# Patient Record
Sex: FEMALE | Race: WHITE | Employment: OTHER | URBAN - METROPOLITAN AREA
[De-identification: names, ages, dates, MRNs, and addresses within clinical notes are randomized per-mention and may not be internally consistent; named-entity substitution may affect disease eponyms.]

---

## 2023-07-15 ENCOUNTER — APPOINTMENT (OUTPATIENT)
Dept: INTERVENTIONAL RADIOLOGY/VASCULAR | Facility: HOSPITAL | Age: 68
End: 2023-07-15
Attending: NEUROLOGICAL SURGERY
Payer: MEDICARE

## 2023-07-15 ENCOUNTER — APPOINTMENT (OUTPATIENT)
Dept: CT IMAGING | Facility: HOSPITAL | Age: 68
End: 2023-07-15
Attending: NEUROLOGICAL SURGERY
Payer: MEDICARE

## 2023-07-15 ENCOUNTER — HOSPITAL ENCOUNTER (EMERGENCY)
Facility: HOSPITAL | Age: 68
Discharge: ANOTHER HOSPITAL | End: 2023-07-15
Attending: EMERGENCY MEDICINE
Payer: MEDICARE

## 2023-07-15 ENCOUNTER — APPOINTMENT (OUTPATIENT)
Dept: CT IMAGING | Facility: HOSPITAL | Age: 68
End: 2023-07-15
Attending: EMERGENCY MEDICINE
Payer: MEDICARE

## 2023-07-15 ENCOUNTER — ANESTHESIA EVENT (OUTPATIENT)
Dept: GENERAL RADIOLOGY | Facility: HOSPITAL | Age: 68
End: 2023-07-15
Payer: MEDICARE

## 2023-07-15 ENCOUNTER — APPOINTMENT (OUTPATIENT)
Dept: GENERAL RADIOLOGY | Facility: HOSPITAL | Age: 68
End: 2023-07-15
Attending: EMERGENCY MEDICINE
Payer: MEDICARE

## 2023-07-15 ENCOUNTER — HOSPITAL ENCOUNTER (INPATIENT)
Facility: HOSPITAL | Age: 68
LOS: 4 days | Discharge: HOME | End: 2023-07-19
Attending: EMERGENCY MEDICINE | Admitting: HOSPITALIST
Payer: MEDICARE

## 2023-07-15 ENCOUNTER — APPOINTMENT (OUTPATIENT)
Dept: CT IMAGING | Facility: HOSPITAL | Age: 68
End: 2023-07-15
Payer: MEDICARE

## 2023-07-15 ENCOUNTER — ANESTHESIA (OUTPATIENT)
Dept: GENERAL RADIOLOGY | Facility: HOSPITAL | Age: 68
End: 2023-07-15
Payer: MEDICARE

## 2023-07-15 VITALS
WEIGHT: 199.06 LBS | RESPIRATION RATE: 22 BRPM | HEART RATE: 77 BPM | TEMPERATURE: 99 F | DIASTOLIC BLOOD PRESSURE: 101 MMHG | SYSTOLIC BLOOD PRESSURE: 165 MMHG | OXYGEN SATURATION: 94 %

## 2023-07-15 DIAGNOSIS — I63.9 ACUTE ISCHEMIC STROKE (HCC): Primary | ICD-10-CM

## 2023-07-15 DIAGNOSIS — I63.512 ACUTE ISCHEMIC LEFT MCA STROKE (HCC): ICD-10-CM

## 2023-07-15 DIAGNOSIS — I63.9 ACUTE CVA (CEREBROVASCULAR ACCIDENT) (HCC): Primary | ICD-10-CM

## 2023-07-15 LAB
ALBUMIN SERPL-MCNC: 3.7 G/DL (ref 3.4–5)
ALP LIVER SERPL-CCNC: 74 U/L
ALT SERPL-CCNC: 25 U/L
ANION GAP SERPL CALC-SCNC: 8 MMOL/L (ref 0–18)
AST SERPL-CCNC: 22 U/L (ref 15–37)
ATRIAL RATE: 79 BPM
BASOPHILS # BLD AUTO: 0.07 X10(3) UL (ref 0–0.2)
BASOPHILS NFR BLD AUTO: 0.8 %
BILIRUB DIRECT SERPL-MCNC: 0.2 MG/DL (ref 0–0.2)
BILIRUB SERPL-MCNC: 1.1 MG/DL (ref 0.1–2)
BUN BLD-MCNC: 18 MG/DL (ref 7–18)
BUN/CREAT SERPL: 21.4 (ref 10–20)
CALCIUM BLD-MCNC: 9.3 MG/DL (ref 8.5–10.1)
CHLORIDE SERPL-SCNC: 109 MMOL/L (ref 98–112)
CHOLEST SERPL-MCNC: 173 MG/DL (ref ?–200)
CO2 SERPL-SCNC: 27 MMOL/L (ref 21–32)
CREAT BLD-MCNC: 0.84 MG/DL
DEPRECATED RDW RBC AUTO: 46.2 FL (ref 35.1–46.3)
EOSINOPHIL # BLD AUTO: 0.3 X10(3) UL (ref 0–0.7)
EOSINOPHIL NFR BLD AUTO: 3.4 %
ERYTHROCYTE [DISTWIDTH] IN BLOOD BY AUTOMATED COUNT: 13.6 % (ref 11–15)
EST. AVERAGE GLUCOSE BLD GHB EST-MCNC: 117 MG/DL (ref 68–126)
GFR SERPLBLD BASED ON 1.73 SQ M-ARVRAT: 75 ML/MIN/1.73M2 (ref 60–?)
GLUCOSE BLD-MCNC: 102 MG/DL (ref 70–99)
GLUCOSE BLD-MCNC: 112 MG/DL (ref 70–99)
GLUCOSE BLDC GLUCOMTR-MCNC: 100 MG/DL (ref 70–99)
HBA1C MFR BLD: 5.7 % (ref ?–5.7)
HCT VFR BLD AUTO: 39.9 %
HDLC SERPL-MCNC: 63 MG/DL (ref 40–59)
HGB BLD-MCNC: 13.3 G/DL
IMM GRANULOCYTES # BLD AUTO: 0.01 X10(3) UL (ref 0–1)
IMM GRANULOCYTES NFR BLD: 0.1 %
LDLC SERPL CALC-MCNC: 92 MG/DL (ref ?–100)
LYMPHOCYTES # BLD AUTO: 2.6 X10(3) UL (ref 1–4)
LYMPHOCYTES NFR BLD AUTO: 29.3 %
MCH RBC QN AUTO: 30.5 PG (ref 26–34)
MCHC RBC AUTO-ENTMCNC: 33.3 G/DL (ref 31–37)
MCV RBC AUTO: 91.5 FL
MONOCYTES # BLD AUTO: 1 X10(3) UL (ref 0.1–1)
MONOCYTES NFR BLD AUTO: 11.3 %
NEUTROPHILS # BLD AUTO: 4.9 X10 (3) UL (ref 1.5–7.7)
NEUTROPHILS # BLD AUTO: 4.9 X10(3) UL (ref 1.5–7.7)
NEUTROPHILS NFR BLD AUTO: 55.1 %
NONHDLC SERPL-MCNC: 110 MG/DL (ref ?–130)
OSMOLALITY SERPL CALC.SUM OF ELEC: 301 MOSM/KG (ref 275–295)
P AXIS: 38 DEGREES
P-R INTERVAL: 168 MS
PLATELET # BLD AUTO: 269 10(3)UL (ref 150–450)
POTASSIUM SERPL-SCNC: 3.4 MMOL/L (ref 3.5–5.1)
PROCALCITONIN SERPL-MCNC: <0.05 NG/ML (ref ?–0.16)
PROT SERPL-MCNC: 7.4 G/DL (ref 6.4–8.2)
Q-T INTERVAL: 404 MS
QRS DURATION: 100 MS
QTC CALCULATION (BEZET): 463 MS
R AXIS: 10 DEGREES
RBC # BLD AUTO: 4.36 X10(6)UL
SODIUM SERPL-SCNC: 144 MMOL/L (ref 136–145)
T AXIS: 67 DEGREES
TRIGL SERPL-MCNC: 102 MG/DL (ref 30–149)
TROPONIN I HIGH SENSITIVITY: 59 NG/L
TROPONIN I HIGH SENSITIVITY: 62 NG/L
VENTRICULAR RATE: 79 BPM
VLDLC SERPL CALC-MCNC: 17 MG/DL (ref 0–30)
WBC # BLD AUTO: 8.9 X10(3) UL (ref 4–11)

## 2023-07-15 PROCEDURE — 70496 CT ANGIOGRAPHY HEAD: CPT | Performed by: EMERGENCY MEDICINE

## 2023-07-15 PROCEDURE — B31RYZZ FLUOROSCOPY OF INTRACRANIAL ARTERIES USING OTHER CONTRAST: ICD-10-PCS | Performed by: NEUROLOGICAL SURGERY

## 2023-07-15 PROCEDURE — 99291 CRITICAL CARE FIRST HOUR: CPT

## 2023-07-15 PROCEDURE — 80048 BASIC METABOLIC PNL TOTAL CA: CPT | Performed by: EMERGENCY MEDICINE

## 2023-07-15 PROCEDURE — 80076 HEPATIC FUNCTION PANEL: CPT | Performed by: EMERGENCY MEDICINE

## 2023-07-15 PROCEDURE — 71045 X-RAY EXAM CHEST 1 VIEW: CPT | Performed by: EMERGENCY MEDICINE

## 2023-07-15 PROCEDURE — 03CG3Z7 EXTIRPATION OF MATTER FROM INTRACRANIAL ARTERY USING STENT RETRIEVER, PERCUTANEOUS APPROACH: ICD-10-PCS | Performed by: NEUROLOGICAL SURGERY

## 2023-07-15 PROCEDURE — 85025 COMPLETE CBC W/AUTO DIFF WBC: CPT | Performed by: EMERGENCY MEDICINE

## 2023-07-15 PROCEDURE — 99285 EMERGENCY DEPT VISIT HI MDM: CPT

## 2023-07-15 PROCEDURE — 36415 COLL VENOUS BLD VENIPUNCTURE: CPT

## 2023-07-15 PROCEDURE — 70498 CT ANGIOGRAPHY NECK: CPT | Performed by: EMERGENCY MEDICINE

## 2023-07-15 PROCEDURE — 99291 CRITICAL CARE FIRST HOUR: CPT | Performed by: NEUROLOGICAL SURGERY

## 2023-07-15 PROCEDURE — 70450 CT HEAD/BRAIN W/O DYE: CPT | Performed by: EMERGENCY MEDICINE

## 2023-07-15 PROCEDURE — 84484 ASSAY OF TROPONIN QUANT: CPT | Performed by: EMERGENCY MEDICINE

## 2023-07-15 PROCEDURE — 70450 CT HEAD/BRAIN W/O DYE: CPT | Performed by: NEUROLOGICAL SURGERY

## 2023-07-15 PROCEDURE — 80061 LIPID PANEL: CPT | Performed by: EMERGENCY MEDICINE

## 2023-07-15 PROCEDURE — 93010 ELECTROCARDIOGRAM REPORT: CPT

## 2023-07-15 PROCEDURE — 99223 1ST HOSP IP/OBS HIGH 75: CPT | Performed by: HOSPITALIST

## 2023-07-15 PROCEDURE — 82962 GLUCOSE BLOOD TEST: CPT

## 2023-07-15 PROCEDURE — 0042T CT STROKE(DAWN BRAIN) PERFUSION ONLY(CPT=0042T): CPT | Performed by: EMERGENCY MEDICINE

## 2023-07-15 RX ORDER — ASPIRIN 300 MG/1
300 SUPPOSITORY RECTAL ONCE
Status: COMPLETED | OUTPATIENT
Start: 2023-07-15 | End: 2023-07-15

## 2023-07-15 RX ORDER — ACETAMINOPHEN 160 MG
2000 TABLET,DISINTEGRATING ORAL DAILY
COMMUNITY

## 2023-07-15 RX ORDER — LIDOCAINE HYDROCHLORIDE 10 MG/ML
INJECTION, SOLUTION EPIDURAL; INFILTRATION; INTRACAUDAL; PERINEURAL AS NEEDED
Status: DISCONTINUED | OUTPATIENT
Start: 2023-07-15 | End: 2023-07-15 | Stop reason: SURG

## 2023-07-15 RX ORDER — ONDANSETRON 2 MG/ML
4 INJECTION INTRAMUSCULAR; INTRAVENOUS ONCE AS NEEDED
Status: ACTIVE | OUTPATIENT
Start: 2023-07-15 | End: 2023-07-15

## 2023-07-15 RX ORDER — LOSARTAN POTASSIUM 50 MG/1
50 TABLET ORAL DAILY
COMMUNITY
End: 2023-07-19

## 2023-07-15 RX ORDER — ACETAMINOPHEN 650 MG/1
650 SUPPOSITORY RECTAL EVERY 4 HOURS PRN
Status: DISCONTINUED | OUTPATIENT
Start: 2023-07-15 | End: 2023-07-19

## 2023-07-15 RX ORDER — ASPIRIN 81 MG/1
81 TABLET, CHEWABLE ORAL DAILY
COMMUNITY

## 2023-07-15 RX ORDER — ONDANSETRON 2 MG/ML
4 INJECTION INTRAMUSCULAR; INTRAVENOUS EVERY 6 HOURS PRN
Status: DISCONTINUED | OUTPATIENT
Start: 2023-07-15 | End: 2023-07-17

## 2023-07-15 RX ORDER — ESCITALOPRAM OXALATE 5 MG/1
5 TABLET ORAL DAILY
Status: DISCONTINUED | OUTPATIENT
Start: 2023-07-16 | End: 2023-07-19

## 2023-07-15 RX ORDER — ESCITALOPRAM OXALATE 5 MG/1
5 TABLET ORAL DAILY
COMMUNITY

## 2023-07-15 RX ORDER — LABETALOL HYDROCHLORIDE 5 MG/ML
10 INJECTION, SOLUTION INTRAVENOUS EVERY 10 MIN PRN
Status: DISCONTINUED | OUTPATIENT
Start: 2023-07-15 | End: 2023-07-15

## 2023-07-15 RX ORDER — ATORVASTATIN CALCIUM 40 MG/1
40 TABLET, FILM COATED ORAL NIGHTLY
Status: DISCONTINUED | OUTPATIENT
Start: 2023-07-15 | End: 2023-07-16

## 2023-07-15 RX ORDER — AMOXICILLIN 250 MG
1 CAPSULE ORAL DAILY
COMMUNITY

## 2023-07-15 RX ORDER — PANTOPRAZOLE SODIUM 40 MG/1
40 TABLET, DELAYED RELEASE ORAL
COMMUNITY

## 2023-07-15 RX ORDER — ONDANSETRON 2 MG/ML
4 INJECTION INTRAMUSCULAR; INTRAVENOUS EVERY 6 HOURS PRN
Status: DISCONTINUED | OUTPATIENT
Start: 2023-07-15 | End: 2023-07-19

## 2023-07-15 RX ORDER — ACETAMINOPHEN 325 MG/1
650 TABLET ORAL EVERY 4 HOURS PRN
Status: DISCONTINUED | OUTPATIENT
Start: 2023-07-15 | End: 2023-07-19

## 2023-07-15 RX ORDER — SODIUM CHLORIDE 9 MG/ML
INJECTION, SOLUTION INTRAVENOUS CONTINUOUS
Status: DISCONTINUED | OUTPATIENT
Start: 2023-07-15 | End: 2023-07-17

## 2023-07-15 RX ORDER — MIDAZOLAM HYDROCHLORIDE 1 MG/ML
1 INJECTION INTRAMUSCULAR; INTRAVENOUS EVERY 5 MIN PRN
Status: ACTIVE | OUTPATIENT
Start: 2023-07-15 | End: 2023-07-15

## 2023-07-15 RX ORDER — MELATONIN
325
COMMUNITY

## 2023-07-15 RX ORDER — SODIUM CHLORIDE 9 MG/ML
INJECTION, SOLUTION INTRAVENOUS CONTINUOUS PRN
Status: DISCONTINUED | OUTPATIENT
Start: 2023-07-15 | End: 2023-07-15 | Stop reason: SURG

## 2023-07-15 RX ORDER — ACETAMINOPHEN 500 MG
500 TABLET ORAL EVERY 4 HOURS PRN
Status: DISCONTINUED | OUTPATIENT
Start: 2023-07-15 | End: 2023-07-19

## 2023-07-15 RX ORDER — IODIXANOL 320 MG/ML
200 INJECTION, SOLUTION INTRAVASCULAR
Status: COMPLETED | OUTPATIENT
Start: 2023-07-15 | End: 2023-07-15

## 2023-07-15 RX ORDER — METOCLOPRAMIDE HYDROCHLORIDE 5 MG/ML
10 INJECTION INTRAMUSCULAR; INTRAVENOUS EVERY 8 HOURS PRN
Status: DISCONTINUED | OUTPATIENT
Start: 2023-07-15 | End: 2023-07-15

## 2023-07-15 RX ORDER — SODIUM CHLORIDE, SODIUM LACTATE, POTASSIUM CHLORIDE, CALCIUM CHLORIDE 600; 310; 30; 20 MG/100ML; MG/100ML; MG/100ML; MG/100ML
INJECTION, SOLUTION INTRAVENOUS CONTINUOUS
Status: DISCONTINUED | OUTPATIENT
Start: 2023-07-15 | End: 2023-07-15

## 2023-07-15 RX ORDER — HYDRALAZINE HYDROCHLORIDE 20 MG/ML
10 INJECTION INTRAMUSCULAR; INTRAVENOUS EVERY 2 HOUR PRN
Status: DISCONTINUED | OUTPATIENT
Start: 2023-07-15 | End: 2023-07-19

## 2023-07-15 RX ORDER — METOCLOPRAMIDE HYDROCHLORIDE 5 MG/ML
10 INJECTION INTRAMUSCULAR; INTRAVENOUS EVERY 8 HOURS PRN
Status: DISCONTINUED | OUTPATIENT
Start: 2023-07-15 | End: 2023-07-19

## 2023-07-15 RX ORDER — LABETALOL HYDROCHLORIDE 5 MG/ML
10 INJECTION, SOLUTION INTRAVENOUS EVERY 4 HOURS PRN
Status: DISCONTINUED | OUTPATIENT
Start: 2023-07-15 | End: 2023-07-19

## 2023-07-15 RX ORDER — BISOPROLOL FUMARATE 5 MG/1
5 TABLET, FILM COATED ORAL DAILY
COMMUNITY
End: 2023-07-19

## 2023-07-15 RX ORDER — ATORVASTATIN CALCIUM 40 MG/1
40 TABLET, FILM COATED ORAL DAILY
Status: ON HOLD | COMMUNITY
End: 2023-07-19

## 2023-07-15 RX ADMIN — LIDOCAINE HYDROCHLORIDE 1 ML: 10 INJECTION, SOLUTION EPIDURAL; INFILTRATION; INTRACAUDAL; PERINEURAL at 09:46:00

## 2023-07-15 RX ADMIN — SODIUM CHLORIDE: 9 INJECTION, SOLUTION INTRAVENOUS at 09:44:00

## 2023-07-15 NOTE — SLP NOTE
ADULT SWALLOWING EVALUATION    ASSESSMENT    ASSESSMENT/OVERALL IMPRESSION:  Pt is a 70year old female with a history of an MI on ASA81 and statin who presented to the [facility] ED with aphasia and rt sided weakness. Pmhx includes a [address] and anxiety. Pt with significant word finding difficulty during conversation however some automatic speech was noted to be fluent. PO trials of puree thin and a cracker were given. Oral mech exam revealed WNL oral range, rate and strength. Strong volitional cough and clear vocal quality noted. Pt with good oral retrieval and containment, adequate mastication and no oral residue noted post swallow. Pt with good hyolaryngeal elevation via palpation and timely swallow response. No s/s of aspiration noted. Discussed with family with pt that pt is safe to initiate regular thin diet at this time. Discussed that a full communication eval will be done tomorrow and gave family and pt strategies to help with word finding. Answered all questions to pt and families apparent satisfaction. RECOMMENDATIONS   Diet Recommendations - Solids: Regular  Diet Recommendations - Liquids: Thin Liquids                        Compensatory Strategies Recommended: Slow rate;Small bites and sips  Aspiration Precautions: Upright position; Slow rate;Small bites and sips  Medication Administration Recommendations: No restrictions  Treatment Plan/Recommendations: Communication evaluation  Discharge Recommendations/Plan: Undetermined    HISTORY   MEDICAL HISTORY  Reason for Referral: Stroke protocol    Problem List  Principal Problem:    Acute CVA (cerebrovascular accident) [facility])      Past Medical History  Past Medical History:   Diagnosis Date    Anxiety     Arrhythmia     Coronary atherosclerosis     Essential hypertension     Heart attack ([address]     High cholesterol     Hyperlipidemia        Prior Living Situation: Home with spouse  Diet Prior to Admission: Regular; Thin liquids  Precautions: Aspiration    Patient/Family Goals: none stated    SWALLOWING HISTORY  Current Diet Consistency: Regular; Thin liquids  Dysphagia History: No reported history  Imaging Results: No hemorrhage or extra-axial fluid collection. High density throughout the vascular structures is likely related to recent administration of contrast.  Ventricles and sulci are appropriate for the patient's age. No mass effect. Mild lucencies in the   white matter are noted. SUBJECTIVE       OBJECTIVE   ORAL MOTOR EXAMINATION  Dentition: Natural;Functional  Symmetry: Within Functional Limits  Strength: Within Functional Limits  Tone: Within Functional Limits  Range of Motion: Within Functional Limits  Rate of Motion: Within Functional Limits    Voice Quality: Clear  Respiratory Status: Unlabored  Consistencies Trialed: Thin liquids;Puree;Hard solid  Method of Presentation: Self presentation;Staff/Clinician assistance  Patient Positioning: Upright    Oral Phase of Swallow: Within Functional Limits                      Pharyngeal Phase of Swallow: Within Functional Limits           (Please note: Silent aspiration cannot be evaluated clinically. Videofluoroscopic Swallow Study is required to rule-out silent aspiration.)       Comments:               GOALS  Goal #1 The patient will tolerate regular consistency and thin liquids without overt signs or symptoms of aspiration with 100 % accuracy over 1-2 session(s). In Progress   Goal #2 The patient/family/caregiver will demonstrate understanding and implementation of aspiration precautions and swallow strategies independently over 1-2 session(s).       In progress   Goal #3 Pt will participate in a communication eval   In progress   Goal #4     Goal #5     Goal #6     Goal #7     Goal #8       FOLLOW UP  Treatment Plan/Recommendations: Communication evaluation  Number of Visits to Meet Established Goals: 3  Follow Up Needed (Documentation Required): Yes  SLP Follow-up Date: 07/16/23    Thank you for your referral.   If you have any questions, please contact Eliza Fisher, SLP

## 2023-07-15 NOTE — SIGNIFICANT EVENT
Called to ED for Stroke transfer from [place] [id number] at 824  Arrived to department at 0906  Patient arriving via EMS into [facility], ED MD present  Last Known Normal at 1030 pm  Pre-morbid mRS 0  Initial NIHSS 9 including Right arm and right leg drift, sensory loss on the right side, aphasia  Pt accompanied to CT dept  Dr Patricia Garcia (Neuro Critical Care) notified at 1270 (transfer from [place] [id number])  Dr Festus Urbina (Neuro Interventional Radiology) notified at 0800 by [facility]  Repeat NIHSS completed back in ED room    NIH Stroke Scale  1a. Level of consciousness: 0   1b. LOC questions:  2   1c. LOC commands: 0   2. Best Gaze: 0   3. Visual: 0   4. Facial Palsy: 0   5a. Motor left arm: 0   5b. Motor right arm: 1   6a. Motor left le   6b. Motor right le   7. Limb Ataxia: 0   8. Sensory: 1   9. Best Language:  3   10. Dysarthria: 0   11. Extinction and Inattention: 0     Total:   8      Other symptoms include patient fell out of bed this AM    Dr Patricia Garcia and Dr Festus Urbina updated on patient's status, CT/CTA results and repeat NIHSS   Per Dr Festus Urbina, patient is a candidate for neuro intervention  Case discussed with Dr Kaylyn Meyer, ED  Dysphagia evaluation prior to administration of oral medications discussed with patient's RN Yoandy Smith    Of note: Patient was a transfer from [facility]. Dr. Festus Urbina called at 3513 to inform the stroke navigator that a patient was transferring over from [place] [address] and needed a CT perfusion before going to angio suite. Perfusion completed. Anesthesia at bedside and accompanied to CT scanner. Consents signed with  with patient and son present. Please refer to detailed breakdown of NIHSS above.       Reinaldo Ortiz, RN, BSN  Stroke Navigator  [phone]

## 2023-07-15 NOTE — ED QUICK NOTES
EKG Delay d/t pt going straight to CT via EMS cot with Medics, RN, and MD. EKG to be obtained as soon as pt is back in department.

## 2023-07-15 NOTE — PROGRESS NOTES
07/15/23 0731   Clinical Encounter Type   Visited With Family; Patient not available   Crisis Visit   (Stroke Alert)   Taxonomy   Intended Effects Convey a calming presence   Methods Offer support   Interventions Acknowledge current situation; Active listening     Discussion:  responded to Stroke Alert in ED. Pt not in room. Spoke with SKAINA Roth after she received call that family is in waiting room.  introduced self to family in waiting room and offered support. Family declined  support, initially with elevated emotional response; indicated they will support each other.  provided calming presence and explanation that pt is in test and family will be called back when pt is in room. Advised on  availability and to request as needed.  follow-up visit available prn and can be contacted at [phone].     Darryl Meade, Chaplain Resident

## 2023-07-15 NOTE — ED INITIAL ASSESSMENT (HPI)
Patient presents to ER via EMS with c/o fall out of bed this AM.  Right arm weakness and slurred speech noted. Last well known time was 2230 last night.

## 2023-07-15 NOTE — CM/SW NOTE
Requested by LIBAN to connect him with [facility] hospitalist for stroke alert per protocol. Edw hospitalist perfectserved.     Dr. Kristin Casarez hospitalist returned call and call transferred to LIBAN.

## 2023-07-15 NOTE — ED QUICK NOTES
PT back to room w/ RN, stroke navigator, anesthesiologist and family at bedside. Pt to be going to NI lab. Vitals remain stable.

## 2023-07-15 NOTE — ED PROVIDER NOTES
Patient Seen in: [facility] Emergency Department      History   Patient presents with:  Stroke    Stated Complaint: code stroke    Subjective:   HPI    Patient 79-year-old female sent over from [facility] for evaluation of stroke. Last known normal was 10 PM last night and she arrived aphasic. During the work-up at the outside hospital she was found to have an acute occlusion of her MCA. See report for details. She is not a tPA candidate because of the time window but neuro interventional was informed and accepted for neurovascular intervention. Was given rectal aspirin and sent to the ER at [facility]. Objective:   No pertinent past medical history. No pertinent past surgical history. No pertinent social history. Review of Systems    Positive for stated complaint: code stroke  Other systems are as noted in HPI. Constitutional and vital signs reviewed. All other systems reviewed and negative except as noted above. Physical Exam     ED Triage Vitals [07/15/23 0908]   BP (!) 184/166   Pulse 77   Resp 22   Temp    Temp src    SpO2 95 %   O2 Device None (Room air)       Current:BP (!) 184/166   Pulse 77   Resp 22   SpO2 95%         Physical Exam  Vitals and nursing note reviewed. Constitutional:       General: She is not in acute distress. Appearance: She is well-developed. She is not toxic-appearing. HENT:      Head: Normocephalic and atraumatic. Eyes:      General: No scleral icterus. Conjunctiva/sclera: Conjunctivae normal.   Cardiovascular:      Rate and Rhythm: Normal rate. Pulmonary:      Effort: Pulmonary effort is normal. No respiratory distress. Abdominal:      General: There is no distension. Musculoskeletal:         General: No tenderness. Normal range of motion. Cervical back: Normal range of motion and neck supple. Skin:     General: Skin is warm and dry. Findings: No rash.    Neurological: Mental Status: She is alert. Motor: No abnormal muscle tone. Psychiatric:         Behavior: Behavior normal.     Only A brief cursory exam was only able to be performed as patient was expeditiously transferred to the neuro interventional suite         ED Course   Labs Reviewed - No data to display                 MDM         -Tracing on cardiac monitor and pulse oximetry was reviewed by myself. -The cardiac monitor revealed normal sinus rhythm as interpreted by me. The cardiac monitor was ordered to monitor the patient for dysrhythmia  -Pulse oximetry was interpreted by me and was normal.  Pulse oximeter was ordered to monitor patient for hypoxia.        -History source other than patient - outside hospital           -I personally reviewed the prior external notes and the medical record to obtain additional history -CTA results confirming MCA occlusion, ED notes from OSH         -DDX: Includes but not limited to intracranial hemorrhage, acute ischemic stroke-which are life threats           -Patient case discussed with specialists neuro critical care, hospitalist      Admission disposition: 7/15/2023  9:13 AM                                        Medical Decision Making      Disposition and Plan     Clinical Impression:  Acute CVA (cerebrovascular accident) [facility])  (primary encounter diagnosis)     Disposition:  Admit  7/15/2023  9:13 am    Follow-up:  No follow-up provider specified.         Medications Prescribed:  Current Discharge Medication List                          Hospital Problems       Present on Admission             ICD-10-CM Noted POA    * (Principal) Acute CVA (cerebrovascular accident) ([facility] [address] I63.9 7/15/2023 Unknown

## 2023-07-15 NOTE — ED INITIAL ASSESSMENT (HPI)
Stroke from [facility], Right sided deficits, LKW - 1030pm 7/14. Presents with aphasia.

## 2023-07-15 NOTE — BRIEF OP NOTE
Pre-Operative Diagnosis: Acute ischemic stroke, Left M2 occlusion     Post-Operative Diagnosis: Same     Procedure Performed: Diagnostic cerebral angiogram and left M2 mechanical thrombectomy    Attending: Mirna Braxton    Assistant(s): None     Surgical Findings: Left dominant inferior division M2 occlusion with TICI2c revascularization following two mechanical thrombectomy passes     Plan:   Q1' neuro checks  Head CT post procedure  -140  Right leg straight x 2 hours  Stroke protocol per [facility]    Case discussed with Dr. Lulu Vickers of [facility]. Family updated.

## 2023-07-15 NOTE — ANESTHESIA PROCEDURE NOTES
Arterial Line    Date/Time: 7/15/2023 9:47 AM    Performed by: Savannah Soto MD  Authorized by: Savannah Soto MD    General Information and Staff    Procedure Start:  7/15/2023 9:47 AM  Procedure End:  7/15/2023 9:50 AM  Anesthesiologist:  Savannah Soto MD  Performed By:  Anesthesiologist  Patient Location:  OR  Indication: continuous blood pressure monitoring and blood sampling needed    Site Identification: surface landmarks    Preanesthetic Checklist: 2 patient identifiers, IV checked, risks and benefits discussed, monitors and equipment checked, pre-op evaluation, timeout performed, anesthesia consent and sterile technique used    Procedure Details    Catheter Size:  20 G  Catheter Length:  1 and 3/4 inch  Catheter Type:  Arrow  Seldinger Technique?: Yes    Laterality:  Left  Site:  Radial artery  Site Prep: chlorhexidine    Line Secured:  Wrist Brace, tape and Tegaderm    Assessment    Events: patient tolerated procedure well with no complications      Medications  7/15/2023 9:47 AM      Additional Comments

## 2023-07-16 ENCOUNTER — APPOINTMENT (OUTPATIENT)
Dept: CV DIAGNOSTICS | Facility: HOSPITAL | Age: 68
End: 2023-07-16
Attending: INTERNAL MEDICINE
Payer: MEDICARE

## 2023-07-16 ENCOUNTER — APPOINTMENT (OUTPATIENT)
Dept: GENERAL RADIOLOGY | Facility: HOSPITAL | Age: 68
End: 2023-07-16
Attending: HOSPITALIST
Payer: MEDICARE

## 2023-07-16 PROBLEM — I63.512 ACUTE ISCHEMIC LEFT MCA STROKE (HCC): Status: ACTIVE | Noted: 2023-07-16

## 2023-07-16 LAB
ADENOVIRUS PCR:: NOT DETECTED
ANION GAP SERPL CALC-SCNC: 5 MMOL/L (ref 0–18)
B PARAPERT DNA SPEC QL NAA+PROBE: NOT DETECTED
B PERT DNA SPEC QL NAA+PROBE: NOT DETECTED
BASOPHILS # BLD AUTO: 0.06 X10(3) UL (ref 0–0.2)
BASOPHILS NFR BLD AUTO: 0.6 %
BUN BLD-MCNC: 10 MG/DL (ref 7–18)
C PNEUM DNA SPEC QL NAA+PROBE: NOT DETECTED
CALCIUM BLD-MCNC: 8.1 MG/DL (ref 8.5–10.1)
CHLORIDE SERPL-SCNC: 112 MMOL/L (ref 98–112)
CO2 SERPL-SCNC: 24 MMOL/L (ref 21–32)
CORONAVIRUS 229E PCR:: NOT DETECTED
CORONAVIRUS HKU1 PCR:: NOT DETECTED
CORONAVIRUS NL63 PCR:: NOT DETECTED
CORONAVIRUS OC43 PCR:: NOT DETECTED
CREAT BLD-MCNC: 0.54 MG/DL
EOSINOPHIL # BLD AUTO: 0.05 X10(3) UL (ref 0–0.7)
EOSINOPHIL NFR BLD AUTO: 0.5 %
ERYTHROCYTE [DISTWIDTH] IN BLOOD BY AUTOMATED COUNT: 13.6 %
FLUAV RNA SPEC QL NAA+PROBE: NOT DETECTED
FLUBV RNA SPEC QL NAA+PROBE: NOT DETECTED
GFR SERPLBLD BASED ON 1.73 SQ M-ARVRAT: 100 ML/MIN/1.73M2 (ref 60–?)
GLUCOSE BLD-MCNC: 103 MG/DL (ref 70–99)
GLUCOSE BLD-MCNC: 105 MG/DL (ref 70–99)
GLUCOSE BLD-MCNC: 109 MG/DL (ref 70–99)
GLUCOSE BLD-MCNC: 137 MG/DL (ref 70–99)
GLUCOSE BLD-MCNC: 98 MG/DL (ref 70–99)
HCT VFR BLD AUTO: 33.4 %
HGB BLD-MCNC: 11.1 G/DL
IMM GRANULOCYTES # BLD AUTO: 0.03 X10(3) UL (ref 0–1)
IMM GRANULOCYTES NFR BLD: 0.3 %
LYMPHOCYTES # BLD AUTO: 1.35 X10(3) UL (ref 1–4)
LYMPHOCYTES NFR BLD AUTO: 12.8 %
MAGNESIUM SERPL-MCNC: 2.1 MG/DL (ref 1.6–2.6)
MCH RBC QN AUTO: 29.8 PG (ref 26–34)
MCHC RBC AUTO-ENTMCNC: 33.2 G/DL (ref 31–37)
MCV RBC AUTO: 89.8 FL
METAPNEUMOVIRUS PCR:: NOT DETECTED
MONOCYTES # BLD AUTO: 1.08 X10(3) UL (ref 0.1–1)
MONOCYTES NFR BLD AUTO: 10.2 %
MYCOPLASMA PNEUMONIA PCR:: NOT DETECTED
NEUTROPHILS # BLD AUTO: 8.01 X10 (3) UL (ref 1.5–7.7)
NEUTROPHILS # BLD AUTO: 8.01 X10(3) UL (ref 1.5–7.7)
NEUTROPHILS NFR BLD AUTO: 75.6 %
OSMOLALITY SERPL CALC.SUM OF ELEC: 291 MOSM/KG (ref 275–295)
PARAINFLUENZA 1 PCR:: NOT DETECTED
PARAINFLUENZA 2 PCR:: NOT DETECTED
PARAINFLUENZA 3 PCR:: NOT DETECTED
PARAINFLUENZA 4 PCR:: NOT DETECTED
PHOSPHATE SERPL-MCNC: 2 MG/DL (ref 2.5–4.9)
PLATELET # BLD AUTO: 219 10(3)UL (ref 150–450)
POTASSIUM SERPL-SCNC: 3.2 MMOL/L (ref 3.5–5.1)
POTASSIUM SERPL-SCNC: 3.2 MMOL/L (ref 3.5–5.1)
PROCALCITONIN SERPL-MCNC: <0.05 NG/ML (ref ?–0.16)
RBC # BLD AUTO: 3.72 X10(6)UL
RHINOVIRUS/ENTERO PCR:: NOT DETECTED
RSV RNA SPEC QL NAA+PROBE: NOT DETECTED
SARS-COV-2 RNA NPH QL NAA+NON-PROBE: NOT DETECTED
SODIUM SERPL-SCNC: 141 MMOL/L (ref 136–145)
WBC # BLD AUTO: 10.6 X10(3) UL (ref 4–11)

## 2023-07-16 PROCEDURE — 93306 TTE W/DOPPLER COMPLETE: CPT | Performed by: INTERNAL MEDICINE

## 2023-07-16 PROCEDURE — 99292 CRITICAL CARE ADDL 30 MIN: CPT | Performed by: INTERNAL MEDICINE

## 2023-07-16 PROCEDURE — 99291 CRITICAL CARE FIRST HOUR: CPT | Performed by: INTERNAL MEDICINE

## 2023-07-16 PROCEDURE — 71045 X-RAY EXAM CHEST 1 VIEW: CPT | Performed by: HOSPITALIST

## 2023-07-16 PROCEDURE — 99233 SBSQ HOSP IP/OBS HIGH 50: CPT | Performed by: HOSPITALIST

## 2023-07-16 RX ORDER — POTASSIUM CHLORIDE 14.9 MG/ML
20 INJECTION INTRAVENOUS ONCE
Status: COMPLETED | OUTPATIENT
Start: 2023-07-16 | End: 2023-07-16

## 2023-07-16 RX ORDER — PANTOPRAZOLE SODIUM 40 MG/1
40 TABLET, DELAYED RELEASE ORAL
Status: DISCONTINUED | OUTPATIENT
Start: 2023-07-16 | End: 2023-07-19

## 2023-07-16 RX ORDER — GUAIFENESIN 600 MG/1
600 TABLET, EXTENDED RELEASE ORAL 2 TIMES DAILY
Status: DISCONTINUED | OUTPATIENT
Start: 2023-07-16 | End: 2023-07-19

## 2023-07-16 RX ORDER — ASPIRIN 325 MG
325 TABLET, DELAYED RELEASE (ENTERIC COATED) ORAL DAILY
Status: DISCONTINUED | OUTPATIENT
Start: 2023-07-16 | End: 2023-07-18

## 2023-07-16 RX ORDER — ATORVASTATIN CALCIUM 80 MG/1
80 TABLET, FILM COATED ORAL NIGHTLY
Status: DISCONTINUED | OUTPATIENT
Start: 2023-07-16 | End: 2023-07-19

## 2023-07-16 RX ORDER — ENOXAPARIN SODIUM 100 MG/ML
40 INJECTION SUBCUTANEOUS DAILY
Status: DISCONTINUED | OUTPATIENT
Start: 2023-07-16 | End: 2023-07-17

## 2023-07-16 NOTE — PLAN OF CARE
Assumed pt care this am approx 0730. Pt is alert and oriented, L stronger than R side, expressive aphasia (see flowsheets). Up and walking halls, seen by PT/OT. Transfer orders,    Plan of care continued.

## 2023-07-16 NOTE — CM/SW NOTE
Patient seen by therapies--acute rehab recommendation--order sent to [facility] for physiatrist to eval--sent in [facility]--pending

## 2023-07-16 NOTE — PHYSICAL THERAPY NOTE
PHYSICAL THERAPY EVALUATION - INPATIENT     Room Number: 0916/3165-E  Evaluation Date: 7/16/2023  Type of Evaluation: Initial  Physician Order: PT Eval and Treat    Presenting Problem: CAD s/p PCI, HTN, dyslipidemia, Afib, anxiety  Co-Morbidities : MI, HTN, afib  Reason for Therapy: Mobility Dysfunction and Discharge Planning    History related to current admission: Patient is a 70year old female admitted on 7/15/2023 following transfer from [facility] for speaking [facility] with R arm and leg weakness. Pt diagnosed with acute CVA s/p MCA thrombectomy. Impression: CT brain 7/15   CONCLUSION:     Acute, occlusive, 1 cm thrombus within the left M2 segment of the middle cerebral artery with diminished vascularity seen within the distal left MCA branches. Atherosclerotic calcification of the bilateral internal and common carotid arteries with approximately 50% narrowing of the origin of the left internal carotid artery. Multiple airspace opacities within the left upper lobe measuring up to 1.1 cm suspicious for pneumonia. Short term follow up chest CT recommended in 6-12 weeks to demonstrate resolution and to exclude underlying pulmonary malignancy. Multiple other incidental findings as described in the body of the report.     [facility]       This report was called immediately at 0745 hours to emergency department and discussed with Dr. Rubia Menchaca. Dictated by (CST): Jose Fang MD on 7/15/2023 at 7:44 AM      Finalized by (CST): Jose Fang MD on 7/15/2023 at 7:58 AM         ASSESSMENT   In this PT evaluation, the patient presents with the following impairments: word finding difficulties, proximal > distal and R > L LE weakness, impaired gait and decreased functional endurance/ activity tolerance.   These impairments and comorbidities manifest themselves as functional limitations in independent bed mobility, transfers, and gait requiring use of a RW, verbal cuing and CGA from PT. The patient is below baseline and would benefit from skilled inpatient PT to address the above deficits to assist patient in returning to prior to level of function. Functional outcome measures completed include [facility]. The AM-PAC '6-Clicks' Inpatient Basic Mobility Short Form was completed and this patient is demonstrating a Approx Degree of Impairment: 46.58%  degree of impairment in mobility. Research supports that patients with this level of impairment may benefit from [facility]. Patient is functioning far below her baseline level. At baseline patient is active, ambulatory without any assistive device for community distances, independent and here visiting her young grandson along with other family. DISCHARGE RECOMMENDATIONS  PT Discharge Recommendations: Acute rehabilitation    PLAN  PT Treatment Plan: Bed mobility; Endurance; Patient education; Energy conservation; Family education;Gait training;Strengthening;Stair training;Transfer training;Balance training  Rehab Potential : Good  Frequency (Obs): 3-5x/week  Number of Visits to Meet Established Goals: 5      CURRENT GOALS    Goal #1 Patient is able to demonstrate supine - sit EOB @ level: independent     Goal #2 Patient is able to demonstrate transfers Sit to/from Stand at assistance level: modified independent     Goal #3 Patient is able to ambulate 300 feet with assist device: cane - straight at assistance level: modified independent     Goal #4 Patient will ascend/descend 1 full  flight of stairs with supervision and 1 handrail. Goal #5    Goal #6    Goal Comments: Goals established on 7/16/2023    HOME SITUATION  Type of Home: House   Home Layout: Two level  Stairs to Enter : 2  Railing: No          Lives With: Family; Spouse  Drives: Yes  Patient Owned Equipment: Rolling walker;Cane (at home in [place])  Patient Regularly Uses: None    Prior Level of [place]: Patient functions fully independently at baseline.  Has been attending OP PT for R hip therex due to arthritis per patient/family. Per daughter-in-law and confirmed through head nod of patient - patient does experience some mild dyspnea on exertion for prolonged distances (I.e. walking around mall). SUBJECTIVE  \"That is my son. \"       OBJECTIVE  Precautions:  (SBP  < 140)  Fall Risk: High fall risk    WEIGHT BEARING RESTRICTION  Weight Bearing Restriction: None                PAIN ASSESSMENT  Rating: Other (Comment) (patient denies)  Location: cramp R calf while working with OT  Management Techniques: Activity promotion;Repositioning    COGNITION  Overall Cognitive Status:  WFL - within functional limits  Following Commands:  follows all commands and directions without difficulty  Initiation: appears intact  Awareness of Deficits:  decreased awareness of deficits  Problem Solving:  patient with difficulty naming the current month, after 1-2 minutes she held up 7 fingers to indicate the 7th month - July    RANGE OF MOTION AND STRENGTH ASSESSMENT  Upper extremity ROM and strength - defer to OT assessment    Lower extremity ROM is within functional limits    Lower extremity strength is within functional limits Right Hip flexion  4-/5  Left Hip flexion  5/5      BALANCE  Static Sitting: Fair +  Dynamic Sitting: Fair  Static Standing: Fair -  Dynamic Standing: Poor +    ADDITIONAL TESTS                                    ACTIVITY TOLERANCE                         O2 WALK       NEUROLOGICAL FINDINGS                        AM-PAC '6-Clicks' INPATIENT SHORT FORM - BASIC MOBILITY  How much difficulty does the patient currently have. ..   Patient Difficulty: Turning over in bed (including adjusting bedclothes, sheets and blankets)?: A Little   Patient Difficulty: Sitting down on and standing up from a chair with arms (e.g., wheelchair, bedside commode, etc.): A Little   Patient Difficulty: Moving from lying on back to sitting on the side of the bed?: A Little   How much help from another person does the patient currently need. .. Help from Another: Moving to and from a bed to a chair (including a wheelchair)?: A Little   Help from Another: Need to walk in hospital room?: A Little   Help from Another: Climbing 3-5 steps with a railing?: A Little       AM-PAC Score:  Raw Score: 18   Approx Degree of Impairment: 46.58%   Standardized Score (AM-PAC Scale): 43.63   CMS Modifier (G-Code): CK    FUNCTIONAL ABILITY STATUS  Gait Assessment   Functional Mobility/Gait Assessment  Gait Assistance: Minimum assistance  Distance (ft): 75 ft. x2  Assistive Device: Rolling walker  Pattern: R Decreased stance time;Comment (R veering)    Skilled Therapy Provided     Bed Mobility:  Rolling: NT  Supine to sit: NT   Sit to supine: NT     Transfer Mobility:  Sit to stand: CGA to RW and VC's for hand placement   Stand to sit: CGA; VC's for alignment to the chair and for hand placement  Gait = x75 ft. X2 with RW with patient demonstrating veering to the R; standing rest break between bouts    Therapist's Comments: Patient presents to PT sitting up in the bedside chair with numerous supportive family members present at bedside. Patient is functioning below her baseline level as she currently requires VC's for safety during transfers, use of a RW for gait stability and standing rest due to dyspnea on exertion. She presents with proximal > distal and R > L LE weakness. She denies any sensory deficits throughout BLE's. She is an excellent candidate for intensive inpatient rehab as her goal is to regain her baseline active and independent function. Continue PT POC. Exercise/Education Provided:  Energy conservation  Functional activity tolerated  Gait training  Transfer training    Patient End of Session: Up in chair;Needs met;Call light within reach;RN aware of session/findings; All patient questions and concerns addressed; Family present      Patient Evaluation Complexity Level:  History Low - no personal factors and/or co-morbidities Examination of body systems Moderate - addressing a total of 3 or more elements   Clinical Presentation Moderate - Evolving   Clinical Decision Making Moderate - Evolving       PT Session Time: 40 minutes  Gait Trainin minutes  Therapeutic Activity: 0 minutes  Neuromuscular Re-education: 0 minutes  Therapeutic Exercise: 0 minutes

## 2023-07-16 NOTE — PLAN OF CARE
Assumed care of patient at approximately 1930. Received pt on cardene gtt. Weaned off early in the night. Labetalol and hydralazine given to maintain SBP <140. Q1 neuro. Expressive aphasia. See E charting for full neuro assessment. On 3L NC to maintain O2 >94%. R groin C/D/I and soft. R pedal pulse +2. External catheter in place. BM x1. Family updated on POC. Problem: NEUROLOGICAL - ADULT  Goal: Achieves stable or improved neurological status  Description: INTERVENTIONS  - Assess for and report changes in neurological status  - Initiate measures to prevent increased intracranial pressure  - Maintain blood pressure and fluid volume within ordered parameters to optimize cerebral perfusion and minimize risk of hemorrhage  - Monitor temperature, glucose, and sodium.  Initiate appropriate interventions as ordered  Outcome: Progressing  Goal: Achieves maximal functionality and self care  Description: INTERVENTIONS  - Monitor swallowing and airway patency with patient fatigue and changes in neurological status  - Encourage and assist patient to increase activity and self care with guidance from PT/OT  - Encourage visually impaired, hearing impaired and aphasic patients to use assistive/communication devices  Outcome: Progressing

## 2023-07-16 NOTE — PROGRESS NOTES
SPEECH/LANGUAGE/COGNITIVE EVALUATION - INPATIENT    Admission Date: 7/15/2023  Evaluation Date: 07/16/23    Reason for Referral: Stroke protocol    ASSESSMENT & PLAN   ASSESSMENT & IMPRESSION    The patient is a 69-year old female admitted to [facility] due to c/o right sided weakness and slurred speech. Dx: Acute CVA, Left MCA Stroke 7/15/2023. Patient was a candidate for TPN and underwent emergent mechanical thrombectomy, transferred to CNICU for further monitoring. Patient seen for speech and language evaluation per CVA protocol. MRI Brain ordered on 7/16/23. Per Neurosurgery notes: patient's \"aphasia improving\"  Patient received in room, seated upright in bed. She was pleasant and cooperative for the evaluation. Patient verbalized good insight in to rationale for acute hospitalization. She endorsed significant changes in speech, language or reading abilities since hospital admission. However, stated that she felt that her speech as has improved. Per the WAB-R scoring, the patient presents with mild-moderate/moderate fluent type aphasia (Anomic), characterized by relatively intact language comprehension but reduced word-finding within spontaneous/ structured conversations, notable phonemic paraphasias, and impaired repetition of information of increased length and complexity. She could benefit from skilled speech therapy services to improve speech and language skills and to further assess reading/ writing and cognitive communication skills with additional goals to follow as clinically indicated.       Western Aphasia Battery-Revised Bedside:    Spontaneous Speech: Content:    7/10  Spontaneous Speech: Fluency:     5/10  Auditory Verbal Comprehension: Yes/No Questions:        10/10  Sequential commands:      10/10  Repetition:      7/10  Object Naming:       10/10  Reading: Dietary menu: errors and phonemic paraphasias   Writing: Needs further assessment    Bedside Language Score: 81.6  Bedside Aphasia classification: Anomic Aphasia    Assessment(s) Administered: WAB Bedside Score     WAB Bedside Score: 81.6    Deficits Identified: Verbal expression    Discharge Recommendations/Plan: Undetermined    Patient Experiencing Pain: No      GOALS  Goal #1 The patient will complete moderately complex naming tasks (responsive/confrontational) provided minimal-moderate cues with 90% accuracy within 1-2 sessions. In Progress   Goal #2 The patient will comprehend paragraph level information as per [facility] questions with 90% accuracy provided minimal cues within 1-2 sessions    In Progress   Goal #3 The patient will use circumlocution strategies to repair word-finding difficulties in structured conversations with 90% success provided mild-mod cues within 2 therapy sessions. In Progress   Goal #4 Patient will participate in further evaluation of reading/ writing skills with additional goals to follow as indicated  In Progress   Goal #5 Patient will patient will participate in comprehensive assessment of cognitive communication skills with additional goals to follow    In Progress     Prior Living Situation: Home with spouse  Prior Level of Function: Independent      Imaging Results:     CT Brain 7/15/2023: CONCLUSION:  No hemorrhage or extra-axial fluid collection. Patient/Family Goals: To improve speech and language skills    Interdisciplinary Communication: Discussed with RN    Patient, family and/or caregiver has been informed and has taken part in this evaluation and plan of treatment and have been advised and agree on the findings and goals. FOLLOW UP  Treatment Plan/Recommendations: Communication evaluation; Aphasia therapy;Cognitive communication therapy  Number of Visits to Meet Established Goals: 3  Follow Up Needed (Documentation Required): Yes  SLP Follow-up Date: 07/17/23    Thank you for your referral.  If you have any questions please contact COURTNEY Agarwal

## 2023-07-16 NOTE — PLAN OF CARE
Rec'd pt from neuro lab at 1145. S/p Left MCA thrombectomy. Rt Fem access, closed w/angioseal. Rt pedal pulse CDI. Minimal rt sided weakness noted. Expressive aphasia noted. Slight decrease sensation to rt side. Cardene gtt infusing to keep SBP <140. Pt's son and spouse at bedside, updated on POC.

## 2023-07-17 ENCOUNTER — APPOINTMENT (OUTPATIENT)
Dept: MRI IMAGING | Facility: HOSPITAL | Age: 68
End: 2023-07-17
Attending: INTERNAL MEDICINE
Payer: MEDICARE

## 2023-07-17 PROBLEM — I48.0 PAROXYSMAL ATRIAL FIBRILLATION (HCC): Status: ACTIVE | Noted: 2023-07-17

## 2023-07-17 PROBLEM — I63.9 ACUTE CVA (CEREBROVASCULAR ACCIDENT) (HCC): Status: ACTIVE | Noted: 2023-07-17

## 2023-07-17 PROBLEM — E78.5 HYPERLIPIDEMIA: Status: ACTIVE | Noted: 2023-07-17

## 2023-07-17 LAB
ALBUMIN SERPL-MCNC: 2.8 G/DL (ref 3.4–5)
ALBUMIN/GLOB SERPL: 0.9 {RATIO} (ref 1–2)
ALP LIVER SERPL-CCNC: 64 U/L
ALT SERPL-CCNC: 21 U/L
ANION GAP SERPL CALC-SCNC: 3 MMOL/L (ref 0–18)
AST SERPL-CCNC: 24 U/L (ref 15–37)
ATRIAL RATE: 320 BPM
BASOPHILS # BLD AUTO: 0.06 X10(3) UL (ref 0–0.2)
BASOPHILS NFR BLD AUTO: 0.7 %
BILIRUB SERPL-MCNC: 0.8 MG/DL (ref 0.1–2)
BUN BLD-MCNC: 11 MG/DL (ref 7–18)
CALCIUM BLD-MCNC: 8.1 MG/DL (ref 8.5–10.1)
CHLORIDE SERPL-SCNC: 114 MMOL/L (ref 98–112)
CO2 SERPL-SCNC: 25 MMOL/L (ref 21–32)
CREAT BLD-MCNC: 0.56 MG/DL
EOSINOPHIL # BLD AUTO: 0.28 X10(3) UL (ref 0–0.7)
EOSINOPHIL NFR BLD AUTO: 3.5 %
ERYTHROCYTE [DISTWIDTH] IN BLOOD BY AUTOMATED COUNT: 13.7 %
GFR SERPLBLD BASED ON 1.73 SQ M-ARVRAT: 99 ML/MIN/1.73M2 (ref 60–?)
GLOBULIN PLAS-MCNC: 3 G/DL (ref 2.8–4.4)
GLUCOSE BLD-MCNC: 97 MG/DL (ref 70–99)
HCT VFR BLD AUTO: 33.3 %
HGB BLD-MCNC: 10.9 G/DL
IMM GRANULOCYTES # BLD AUTO: 0.02 X10(3) UL (ref 0–1)
IMM GRANULOCYTES NFR BLD: 0.2 %
LYMPHOCYTES # BLD AUTO: 1.68 X10(3) UL (ref 1–4)
LYMPHOCYTES NFR BLD AUTO: 20.8 %
MCH RBC QN AUTO: 29.9 PG (ref 26–34)
MCHC RBC AUTO-ENTMCNC: 32.7 G/DL (ref 31–37)
MCV RBC AUTO: 91.5 FL
MONOCYTES # BLD AUTO: 1.05 X10(3) UL (ref 0.1–1)
MONOCYTES NFR BLD AUTO: 13 %
NEUTROPHILS # BLD AUTO: 4.99 X10 (3) UL (ref 1.5–7.7)
NEUTROPHILS # BLD AUTO: 4.99 X10(3) UL (ref 1.5–7.7)
NEUTROPHILS NFR BLD AUTO: 61.8 %
OSMOLALITY SERPL CALC.SUM OF ELEC: 293 MOSM/KG (ref 275–295)
PHOSPHATE SERPL-MCNC: 2.2 MG/DL (ref 2.5–4.9)
PLATELET # BLD AUTO: 205 10(3)UL (ref 150–450)
POTASSIUM SERPL-SCNC: 3.8 MMOL/L (ref 3.5–5.1)
POTASSIUM SERPL-SCNC: 3.8 MMOL/L (ref 3.5–5.1)
PROT SERPL-MCNC: 5.8 G/DL (ref 6.4–8.2)
Q-T INTERVAL: 308 MS
QRS DURATION: 82 MS
QTC CALCULATION (BEZET): 502 MS
R AXIS: 16 DEGREES
RBC # BLD AUTO: 3.64 X10(6)UL
SODIUM SERPL-SCNC: 142 MMOL/L (ref 136–145)
T AXIS: 90 DEGREES
VENTRICULAR RATE: 160 BPM
WBC # BLD AUTO: 8.1 X10(3) UL (ref 4–11)

## 2023-07-17 PROCEDURE — 70551 MRI BRAIN STEM W/O DYE: CPT | Performed by: INTERNAL MEDICINE

## 2023-07-17 PROCEDURE — 99233 SBSQ HOSP IP/OBS HIGH 50: CPT | Performed by: OTHER

## 2023-07-17 PROCEDURE — 99231 SBSQ HOSP IP/OBS SF/LOW 25: CPT | Performed by: HOSPITALIST

## 2023-07-17 RX ORDER — DILTIAZEM HYDROCHLORIDE 100 MG/1
INJECTION, POWDER, LYOPHILIZED, FOR SOLUTION INTRAVENOUS
Status: DISCONTINUED
Start: 2023-07-17 | End: 2023-07-17 | Stop reason: WASHOUT

## 2023-07-17 NOTE — DISCHARGE PLANNING
Attn: CENTRAL SCHEDULING DEPT. Patient follow-up appointment information:    Patient [place] out of [place] and does not need stroke follow-up appointment. She will set up her follow-up visit with general neurologist in her area.      Gustavo Kimbrough RN, BSN  Stroke Navigator  [phone]

## 2023-07-17 NOTE — PLAN OF CARE
Assumed care of patient at 0730. Alert X 4, mild left weakness and expressive aphasia. Patient converted to a-fib RVR around 1000. Cards consulted. Cardizem gtt with bolus started. Later this afternoon able to wean Cardizem gtt. Plan to start PO. SBP remained 100-140. Groin site c/d/I. Palpable pulses. Voiding. Ambulating halls. PRN tylenol for HA with good relief. Family at bedside and updated  Problem: NEUROLOGICAL - ADULT  Goal: Achieves stable or improved neurological status  Description: INTERVENTIONS  - Assess for and report changes in neurological status  - Initiate measures to prevent increased intracranial pressure  - Maintain blood pressure and fluid volume within ordered parameters to optimize cerebral perfusion and minimize risk of hemorrhage  - Monitor temperature, glucose, and sodium.  Initiate appropriate interventions as ordered  Outcome: Progressing  Goal: Achieves maximal functionality and self care  Description: INTERVENTIONS  - Monitor swallowing and airway patency with patient fatigue and changes in neurological status  - Encourage and assist patient to increase activity and self care with guidance from PT/OT  - Encourage visually impaired, hearing impaired and aphasic patients to use assistive/communication devices  Outcome: Progressing

## 2023-07-17 NOTE — CM/SW NOTE
07/17/23 1400   CM/SW Referral Data   Referral Source Nurse   Reason for Referral Discharge planning   Informant Patient;Son  (daughter in law)   Patient Info   Patient's Current Mental Status at Time of Assessment Alert;Oriented   Patient's [address]   Number of Levels in Home 2   Patient lives with Spouse/Significant other;Son;Other   Patient Status Prior to Admission   Independent with ADLs and Mobility Yes   Discharge Needs   Anticipated D/C needs Acute rehab; To be determined   Services Requested   PMR Consult Requested Consult ordered   Choice of Post-Acute Provider   Informed patient of right to choose their preferred provider Yes     Notified by RN that family is at bedside and they have questions about discharge planning. Met w/pt, her , two sons and DIL at the bedside for eval.  Pt and her  live with son and DIL in [place] part time and live part time in [place] [address]. They are visiting her other son here in [facility]. PT recommendation on 7/16/23 is for Acute Rehab, but per pt/family, she has improved and they are awaiting follow up recommendations. PMR also ordered and pending. If rehab required, they would like pt to go home to [place] for this and provided a list of rehab facilities:    [facility] in [facility] [phone]. (Called them at  [phone] and they are CRISTINA)  [facility] outpatient rehab (affiliated with [facility])  [facility] Fax [phone]    Addendum 1600  Received call from Teagan MOTA, who was attempting to work on OP therapy, but Dr Yu Gilman evaluated pt and Teagan Contreras reports she is agreeing with Acute Rehab. Referral sent via A[facility] to [facility], and other providers near pt's home. Family will provide transportation back to [place]. / to remain available for support and/or discharge planning.      Fermin KEMPA MSN, RN CTL/  [id number]

## 2023-07-17 NOTE — PLAN OF CARE
Assumed care of pt around 1930. A+Ox4. Expressive aphasia. R side slightly weaker than L. SR. HR 90's. -140. R groin soft, no hematoma. Pulses palpable. Breath sounds clear, diminished.

## 2023-07-17 NOTE — PROGRESS NOTES
Stroke booklet given to patient; the following education was provided:     BEFAST - Stroke warning signs and symptoms  Personalized risk factors including HTN, HL, obesity, Afib  Need for follow-up after discharge  How to activate EMS for stroke  Healthy lifestyle (nutrition and exercise)    Family present during education. Patient receptive to teachings. All pertinent questions and concerns were addressed. Patient discharge instructions (References/Attachments) updated.        Paramjit Dodd RN, BSN  Stroke Navigator  [phone]

## 2023-07-17 NOTE — DISCHARGE INSTRUCTIONS
THE [facility] and [facility] Stroke Support Group: STARS (Stroke Treatment and [employer]) for survivors, family members, and caregivers    Aim: To help survivors and families adjust to changes in their lives after a stroke. When: 3rd Thursday of month  Time:   3:00-4:30 pm  Where:  Hybrid meeting (virtual via WebEx or in person at rotating locations)   To [place]:  Email [email or web address]     For [place] stroke support group call [phone] to register     For THE [facility] stroke support group call [phone] to register     Once registered, you will be sent a WebEx link via Email, as well as location information for in-person meeting.     Other resources:  American Stroke Association:   [phone]  [facility]:  [phone]

## 2023-07-18 LAB
ANION GAP SERPL CALC-SCNC: 5 MMOL/L (ref 0–18)
BUN BLD-MCNC: 10 MG/DL (ref 7–18)
CALCIUM BLD-MCNC: 8.5 MG/DL (ref 8.5–10.1)
CHLORIDE SERPL-SCNC: 110 MMOL/L (ref 98–112)
CO2 SERPL-SCNC: 25 MMOL/L (ref 21–32)
CREAT BLD-MCNC: 0.59 MG/DL
ERYTHROCYTE [DISTWIDTH] IN BLOOD BY AUTOMATED COUNT: 13.4 %
GFR SERPLBLD BASED ON 1.73 SQ M-ARVRAT: 97 ML/MIN/1.73M2 (ref 60–?)
GLUCOSE BLD-MCNC: 109 MG/DL (ref 70–99)
HCT VFR BLD AUTO: 33.9 %
HGB BLD-MCNC: 11.6 G/DL
MCH RBC QN AUTO: 30.4 PG (ref 26–34)
MCHC RBC AUTO-ENTMCNC: 34.2 G/DL (ref 31–37)
MCV RBC AUTO: 89 FL
OSMOLALITY SERPL CALC.SUM OF ELEC: 290 MOSM/KG (ref 275–295)
PHOSPHATE SERPL-MCNC: 2.1 MG/DL (ref 2.5–4.9)
PLATELET # BLD AUTO: 233 10(3)UL (ref 150–450)
POTASSIUM SERPL-SCNC: 3.5 MMOL/L (ref 3.5–5.1)
POTASSIUM SERPL-SCNC: 3.5 MMOL/L (ref 3.5–5.1)
RBC # BLD AUTO: 3.81 X10(6)UL
SODIUM SERPL-SCNC: 140 MMOL/L (ref 136–145)
WBC # BLD AUTO: 8.7 X10(3) UL (ref 4–11)

## 2023-07-18 PROCEDURE — 99231 SBSQ HOSP IP/OBS SF/LOW 25: CPT | Performed by: HOSPITALIST

## 2023-07-18 RX ORDER — AMIODARONE HYDROCHLORIDE 200 MG/1
200 TABLET ORAL DAILY
Status: DISCONTINUED | OUTPATIENT
Start: 2023-07-19 | End: 2023-07-18

## 2023-07-18 RX ORDER — AMIODARONE HYDROCHLORIDE 200 MG/1
400 TABLET ORAL 2 TIMES DAILY WITH MEALS
Status: COMPLETED | OUTPATIENT
Start: 2023-07-18 | End: 2023-07-18

## 2023-07-18 RX ORDER — POTASSIUM CHLORIDE 14.9 MG/ML
20 INJECTION INTRAVENOUS ONCE
Status: COMPLETED | OUTPATIENT
Start: 2023-07-18 | End: 2023-07-18

## 2023-07-18 RX ORDER — AMIODARONE HYDROCHLORIDE 200 MG/1
200 TABLET ORAL DAILY
Status: DISCONTINUED | OUTPATIENT
Start: 2023-07-19 | End: 2023-07-19

## 2023-07-18 RX ORDER — METOPROLOL SUCCINATE 25 MG/1
25 TABLET, EXTENDED RELEASE ORAL
Status: DISCONTINUED | OUTPATIENT
Start: 2023-07-19 | End: 2023-07-19

## 2023-07-18 RX ORDER — DILTIAZEM HYDROCHLORIDE 120 MG/1
240 CAPSULE, EXTENDED RELEASE ORAL DAILY
Status: DISCONTINUED | OUTPATIENT
Start: 2023-07-18 | End: 2023-07-19

## 2023-07-18 RX ORDER — ASPIRIN 81 MG/1
81 TABLET ORAL DAILY
Status: DISCONTINUED | OUTPATIENT
Start: 2023-07-19 | End: 2023-07-19

## 2023-07-18 RX ORDER — AMIODARONE HYDROCHLORIDE 200 MG/1
400 TABLET ORAL 2 TIMES DAILY WITH MEALS
Status: DISCONTINUED | OUTPATIENT
Start: 2023-07-18 | End: 2023-07-18

## 2023-07-18 NOTE — PROGRESS NOTES
07/18/23 1316   Clinical Encounter Type   Visited With Patient   Routine Visit Introduction   Continue Visiting No   Patient's Supportive Strategies/Resources Family   Family Spiritual Encounters   Family Coping Accepting   Taxonomy   Intended Effects Establish rapport and connectedness   Methods Encourage sharing of feelings; Offer support   Interventions Acknowledge current situation; Acknowledge response to difficult experience; Active listening; Ask guided questions; Ask questions to bring forth feelings; Discuss concerns;Silent prayer;Explain  role     Discussion:   initiated introductory visit, Pt Walkersusan Atkins) was awake. During visit, Micheline Woodruff shared that she was visiting family and from out of town. She expressed her concerns over next steps and shared her feelings.  provided reflective listening and emotional support as Micheline Woodruff processed. During visit, her  and son arrived. They expressed appreciation for visit and are aware that [facility] is always available and can be requested via RN. Spiritual Care support can be requested via an [facility] consult or ext. [phone].        Sade Frederick M.Div, Chaplain Resident  Ext. [phone]

## 2023-07-18 NOTE — CM/SW NOTE
Spoke with patient and son Meg Han @ bedside--updated both that records have been faxed; additionally offered [facility] as back up rehab since physiatry recommending 6-8 days--await response    Whitley back from [place] with [facility] (phone )--received referral in review--questioned method of transportation for patient to come to their rehab in [place]--family to transport, awaiting response from care providers if this acceptable as the drive is 5-38 hours without direct care provider. Cost of eliquis priced through [employer] $410.38--message sent to Jai CEDILLO--will also provide patient with free 30-day free savings card    Per request of family, provided acute rehab locations--encouraged family to reference with medicare.gov.      Cost of xarelto--$410.39 per month--still able to give 30-day free card--pt possibly could apply and be eligible for miles select program of $85.oo per month

## 2023-07-18 NOTE — CM/SW NOTE
Spoke with daughter in law Valente Arroyo () regarding acute rehab in [place].   She spoke with [facility] (phone [phone] in Admissions contact)--they are not on electronic referral system--records faxed to this rehab--[phone]

## 2023-07-18 NOTE — PLAN OF CARE
Assumed care of pt around 1930. Denies pain. Neuros q4h. R sided weakness, mild expressive aphasia. Afib. Cardizem gtt restarted around 2200. R groin C/D/I, soft, no hematoma. Pulses palpable. Breath sounds clear, diminished. Cardizem gtt weaned off at 2871.

## 2023-07-18 NOTE — PLAN OF CARE
Diltiazem gtt off since shift change this am. Afib/NSR on monitor, 's. Neuro checks improving, see neuro FS. Medication changes made per cards for rate control. Potential plans for discharge tomorrow. Ambulating in hallway independently. Eating and drinking well @ meals. Per neurology, pt okay for 12 hour car ride home to [place]. Communicated this to pt and family today and questions answered. No c/o pain or GI issues.  Will continue to monitor

## 2023-07-19 VITALS
DIASTOLIC BLOOD PRESSURE: 95 MMHG | HEIGHT: 66 IN | BODY MASS INDEX: 31.29 KG/M2 | TEMPERATURE: 98 F | OXYGEN SATURATION: 98 % | RESPIRATION RATE: 26 BRPM | HEART RATE: 79 BPM | WEIGHT: 194.69 LBS | SYSTOLIC BLOOD PRESSURE: 127 MMHG

## 2023-07-19 LAB
ATRIAL RATE: 197 BPM
PHOSPHATE SERPL-MCNC: 2.4 MG/DL (ref 2.5–4.9)
POTASSIUM SERPL-SCNC: 3.5 MMOL/L (ref 3.5–5.1)
Q-T INTERVAL: 392 MS
QRS DURATION: 102 MS
QTC CALCULATION (BEZET): 460 MS
R AXIS: -7 DEGREES
T AXIS: 56 DEGREES
VENTRICULAR RATE: 83 BPM

## 2023-07-19 PROCEDURE — 99238 HOSP IP/OBS DSCHRG MGMT 30/<: CPT | Performed by: STUDENT IN AN ORGANIZED HEALTH CARE EDUCATION/TRAINING PROGRAM

## 2023-07-19 RX ORDER — POTASSIUM CHLORIDE 14.9 MG/ML
20 INJECTION INTRAVENOUS ONCE
Status: DISCONTINUED | OUTPATIENT
Start: 2023-07-19 | End: 2023-07-19

## 2023-07-19 RX ORDER — DILTIAZEM HYDROCHLORIDE 240 MG/1
240 CAPSULE, COATED, EXTENDED RELEASE ORAL DAILY
Qty: 30 CAPSULE | Refills: 0 | Status: SHIPPED | OUTPATIENT
Start: 2023-07-20

## 2023-07-19 RX ORDER — METOPROLOL SUCCINATE 50 MG/1
50 TABLET, EXTENDED RELEASE ORAL
Qty: 30 TABLET | Refills: 0 | Status: SHIPPED | OUTPATIENT
Start: 2023-07-20

## 2023-07-19 RX ORDER — AMIODARONE HYDROCHLORIDE 200 MG/1
200 TABLET ORAL DAILY
Qty: 30 TABLET | Refills: 0 | Status: SHIPPED | OUTPATIENT
Start: 2023-07-20

## 2023-07-19 RX ORDER — ATORVASTATIN CALCIUM 80 MG/1
80 TABLET, FILM COATED ORAL DAILY
Qty: 30 TABLET | Refills: 0 | Status: SHIPPED | OUTPATIENT
Start: 2023-07-19

## 2023-07-19 NOTE — CM/SW NOTE
Met with patient, sons, spouse along with Nessa CEDILLO to discuss discharge plans. Patient feeling 'better' with ongoing improvement in speech and minimal motor deficits. Plan is for discharge today from the hospital, returning to [facility] on Friday. Patient to use meds to beds--provided patient with free 30-day eliquis card--also, provided patient with 30-day xarelto card as well as [place] [facility] select card if her cardiologist in [place] to change [phone].   Patient worked with therapy yesterday--OP therapies recommended--patient feels comfortable with this recommendation and 'no need' for physiatrist to re evaluation

## 2023-07-19 NOTE — PLAN OF CARE
Assumed care of patient at approximately 1930. Very mild expressive aphasia. Improving. RUE and RLE slightly weaker than left side. See E charting for full neuro. Room air. Afebrile. Tolerated dinner. Up to the bathroom, voiding. Denies pain. Possible discharge today.

## 2023-07-19 NOTE — CM/SW NOTE
Requested OP PT, OT and ST orders faxed to [phone] as requested    Original copies given to patient

## 2023-07-19 NOTE — PROGRESS NOTES
NURSING DISCHARGE NOTE    Discharged Home via Wheelchair. Accompanied by Family member  Belongings Taken by patient/family. Pt discharged with belongings and discharge paperwork. PIV's discontinued. PT/OT/Speech referrals sent pt requested location. Pt and family questions and concerns answered.

## 2023-07-19 NOTE — PLAN OF CARE
Assumed care of patient at approximately 0730. Pt A&Ox4, some expressive aphasia noted. Mild RUE weakness. Neuro checks Q4h, see flowsheets. Pt maintaining saturations on room air . On telemetry, A fib. R groin soft/no hematoma. Pt denies c/o pain. Pt up  with standby assist. . Pt updated on plan of care and verbalized understanding. Problem: NEUROLOGICAL - ADULT  Goal: Achieves stable or improved neurological status  Description: INTERVENTIONS  - Assess for and report changes in neurological status  - Initiate measures to prevent increased intracranial pressure  - Maintain blood pressure and fluid volume within ordered parameters to optimize cerebral perfusion and minimize risk of hemorrhage  - Monitor temperature, glucose, and sodium.  Initiate appropriate interventions as ordered  Outcome: Progressing  Goal: Achieves maximal functionality and self care  Description: INTERVENTIONS  - Monitor swallowing and airway patency with patient fatigue and changes in neurological status  - Encourage and assist patient to increase activity and self care with guidance from PT/OT  - Encourage visually impaired, hearing impaired and aphasic patients to use assistive/communication devices  Outcome: Progressing     Problem: Patient/Family Goals  Goal: Patient/Family Long Term Goal  Description: Patient's Long Term Goal: To be discharged    Interventions:  - Monitor groin site  - Monitor neuro status  - Encourage ambulation   - Early discharge education  - See additional Care Plan goals for specific interventions  Outcome: Progressing  Goal: Patient/Family Short Term Goal  Description: Patient's Short Term Goal:  Remain comfortable     Interventions:   - PRN medications  - See additional Care Plan goals for specific interventions  Outcome: Progressing

## 2023-08-19 ENCOUNTER — TELEPHONE (OUTPATIENT)
Dept: MEDSURG UNIT | Facility: HOSPITAL | Age: 68
End: 2023-08-19

## 2023-08-19 NOTE — PROGRESS NOTES
[facility]    Patient Name: Kristen Fuentes Date: 23   : 1955 Gender: female   Date of hospital admission: 7/15/2023 Date of hospital discharge: 2023   Stroke: Ischemic Discharge disposition: Home     Primary source of information: Patient    Method of patient follow-up: Phone call    Patient's current location: Home    Current therapy status: Outpatient therapy    1. Has the patient followed up with general neurology since discharge from the hospital? No, please explain: Patient lives in [place], earliest appointment she could get with neurology is . 2. Does the patient have any questions regarding topics or issues that were discussed during the appointment? No    3. Does the patient have difficulty with mobility, communication and/or completion of ADL's? No    4. Has the patient had any falls since discharge from the hospital? No    5. Is the patient compliant with the medication therapy that was prescribed at discharge? Yes    6. Does the patient have any questions about these medications? No      7. Personalized risk factors and prevention (education provided): Yes    8. Depression screening (PHQ9) completed (referral to stroke support group): Yes    Notes: Referred patient to the American Stroke Association's website where she can find a local support group in her area.

## 2023-10-19 ENCOUNTER — TELEPHONE (OUTPATIENT)
Dept: MEDSURG UNIT | Facility: HOSPITAL | Age: 68
End: 2023-10-19

## 2023-10-19 NOTE — PROGRESS NOTES
[facility]    Patient Name: Houston Simmonds Date: 10/19/23   : 1955 Gender: female   Date of hospital admission: 7/15/2023 Date of hospital discharge: 2023   Stroke: Ischemic Discharge disposition: Home     Primary source of information: Patient    Method of patient follow-up: Phone call    Patient's current location: Home    Current therapy status: Outpatient therapy    1a. At this point in recovery, is the patient able to live alone without any help from another person (is the patient able to bath, use toilet, shop, prepare/get meals, manage finances)? Yes, without any issue (MRS 0)    2. Is the patient able to walk from one room to another without help from another person? Yes    3. Is the patient requiring assistance with any ADL's right now that he/she did not need prior to stroke (ex. Bathing, using the toilet, shopping, preparing/getting meals, managing finances)? No    4. Is the patient able to sit up in bed without any help? Yes    5. Is the patient compliant with the medication therapy that was prescribed at discharge or follow-up visit? Yes    6. Has the patient visited the emergency department since discharge from the hospital? No      7. Has the patient been readmitted to the hospital since discharge from the stroke event? No    8.  Depression screening (PHQ9) completed 0 (referral to stroke support group): Yes Lives in [place] cannot refer to our support group

## (undated) NOTE — LETTER
[facility] [address], [address] [place] [address]  Consent for Procedure/Sedation  Date: 7/15/2023         Time: 9:34    I hereby authorize Dr. Travis Weber, my physician and his/her assistants (if applicable), which may include medical students, residents, and/or fellows, to perform the following surgical operation/ procedure and administer such anesthesia as may be determined necessary by my physician:  Operation/Procedure name (s) Cerebral angiogram, possible thrombectomy, possible angioplasty, possible stent, possible intraarterial tissue plasminogen activator, possible closure device on Shiloh Robert   2. I recognize that during the surgical operation/procedure, unforeseen conditions may necessitate additional or different procedures than those listed above. I, therefore, further authorize and request that the above-named surgeon, assistants, or designees perform such procedures as are, in their judgment, necessary and desirable. 3.   My surgeon/physician has discussed prior to my surgery the potential benefits, risks and side effects of this procedure; the likelihood of achieving goals; and potential problems that might occur during recuperation. They also discussed reasonable alternatives to the procedure, including risks, benefits, and side effects related to the alternatives and risks related to not receiving this procedure. I have had all my questions answered and I acknowledge that no guarantee has been made as to the result that may be obtained. 4.   Should the need arise during my operation/procedure, which includes change of level of care prior to discharge, I also consent to the administration of blood and/or blood products. Further, I understand that despite careful testing and screening of blood or blood products by collecting agencies, I may still be subject to ill effects as a result of receiving a blood transfusion and/or blood products.   The following are some, but not all, of the potential risks that can occur: fever and allergic reactions, hemolytic reactions, transmission of diseases such as Hepatitis, AIDS and Cytomegalovirus (CMV) and fluid overload. In the event that I wish to have an autologous transfusion of my own blood, or a directed donor transfusion, I will discuss this with my physician. Check only if Refusing Blood or Blood Products  I understand refusal of blood or blood products as deemed necessary by my physician may have serious consequences to my condition to include possible death. I hereby assume responsibility for my refusal and release the hospital, its personnel, and my physicians from any responsibility for the consequences of my refusal.          o  Refuse       5. I authorize the use of any specimen, organs, tissues, body parts or foreign objects that may be removed from my body during the operation/procedure for diagnosis, research or teaching purposes and their subsequent disposal by hospital authorities. I also authorize the release of specimen test results and/or written reports to my treating physician on the hospital medical staff or other referring or consulting physicians involved in my care, at the discretion of the Pathologist or my treating physician. 6.   I consent to the photographing or videotaping of the operations or procedures to be performed, including appropriate portions of my body for medical, scientific, or educational purposes, provided my identity is not revealed by the pictures or by descriptive texts accompanying them. If the procedure has been photographed/videotaped, the surgeon will obtain the original picture, image, videotape or CD. The hospital will not be responsible for storage, release or maintenance of the picture, image, tape or CD.    7.   I consent to the presence of a  or observers in the operating room as deemed necessary by my physician or their designees.     8.   I recognize that in the event my procedure results in extended X-Ray/fluoroscopy time, I may develop a skin reaction. 9. If I have a Do Not Attempt Resuscitation (DNAR) order in place, that status will be suspended while in the operating room, procedural suite, and during the recovery period unless otherwise explicitly stated by me (or a person authorized to consent on my behalf). The surgeon or my attending physician will determine when the applicable recovery period ends for purposes of reinstating the DNAR order. 10. Patients having a sterilization procedure: I understand that if the procedure is successful the results will be permanent and it will therefore be impossible for me to inseminate, conceive, or bear children. I also understand that the procedure is intended to result in sterility, although the result has not been guaranteed. 11. I acknowledge that my physician has explained sedation/analgesia administration to me including the risk and benefits I consent to the administration of sedation/analgesia as may be necessary or desirable in the judgment of my physician.     I CERTIFY THAT I HAVE READ AND FULLY UNDERSTAND THE ABOVE CONSENT TO OPERATION and/or OTHER PROCEDURE.          ____________________________________       _________________________________      ______________________________  Signature of Patient         Signature of Responsible Person        Printed Name of Responsible Person        ____________________________________      _________________________________      ______________________________       Signature of Witness          Relationship to Patient                       Date                                       Time     Patient Name: Cori Giang     : 1955                 Printed: July 15, 2023      Medical Record #: [id number]                     Page 1 of 2